# Patient Record
Sex: MALE | Race: WHITE | HISPANIC OR LATINO | Employment: STUDENT | URBAN - METROPOLITAN AREA
[De-identification: names, ages, dates, MRNs, and addresses within clinical notes are randomized per-mention and may not be internally consistent; named-entity substitution may affect disease eponyms.]

---
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
## 2021-03-18 ENCOUNTER — TELEPHONE (OUTPATIENT)
Dept: PEDIATRICS | Facility: CLINIC | Age: 21
End: 2021-03-18

## 2022-01-05 ENCOUNTER — TELEPHONE (OUTPATIENT)
Dept: INTERNAL MEDICINE | Facility: CLINIC | Age: 22
End: 2022-01-05
Payer: COMMERCIAL

## 2022-01-05 NOTE — TELEPHONE ENCOUNTER
----- Message from Chacho Navarro sent at 1/5/2022  1:37 PM CST -----  Contact: elin [phone]  Mrn#  [id number]    Callback# [phone]     Additional Info# PT has never had a PCP as an adult and the PT is trying to set up an annual appt with this DR. Please callback to discuss scheduling options and possible medical advice.

## 2022-02-22 ENCOUNTER — OFFICE VISIT (OUTPATIENT)
Dept: INTERNAL MEDICINE | Facility: CLINIC | Age: 22
End: 2022-02-22
Payer: COMMERCIAL

## 2022-02-22 ENCOUNTER — LAB VISIT (OUTPATIENT)
Dept: LAB | Facility: HOSPITAL | Age: 22
End: 2022-02-22
Payer: COMMERCIAL

## 2022-02-22 VITALS
OXYGEN SATURATION: 99 % | DIASTOLIC BLOOD PRESSURE: 76 MMHG | SYSTOLIC BLOOD PRESSURE: 130 MMHG | BODY MASS INDEX: 23.49 KG/M2 | HEIGHT: 65 IN | WEIGHT: 141 LBS | HEART RATE: 90 BPM

## 2022-02-22 DIAGNOSIS — Z11.3 SCREEN FOR STD (SEXUALLY TRANSMITTED DISEASE): ICD-10-CM

## 2022-02-22 DIAGNOSIS — Z86.19 HISTORY OF GONORRHEA: ICD-10-CM

## 2022-02-22 DIAGNOSIS — Z00.00 VISIT FOR ANNUAL HEALTH EXAMINATION: Primary | ICD-10-CM

## 2022-02-22 DIAGNOSIS — Z86.19 HISTORY OF CHLAMYDIA: ICD-10-CM

## 2022-02-22 PROCEDURE — 3075F PR MOST RECENT SYSTOLIC BLOOD PRESS GE 130-139MM HG: ICD-10-PCS | Mod: CPTII,S$GLB,, | Performed by: INTERNAL MEDICINE

## 2022-02-22 PROCEDURE — 3008F BODY MASS INDEX DOCD: CPT | Mod: CPTII,S$GLB,, | Performed by: INTERNAL MEDICINE

## 2022-02-22 PROCEDURE — 99999 PR PBB SHADOW E&M-EST. PATIENT-LVL IV: CPT | Mod: PBBFAC,,, | Performed by: INTERNAL MEDICINE

## 2022-02-22 PROCEDURE — 3075F SYST BP GE 130 - 139MM HG: CPT | Mod: CPTII,S$GLB,, | Performed by: INTERNAL MEDICINE

## 2022-02-22 PROCEDURE — 99999 PR PBB SHADOW E&M-EST. PATIENT-LVL IV: ICD-10-PCS | Mod: PBBFAC,,, | Performed by: INTERNAL MEDICINE

## 2022-02-22 PROCEDURE — 3078F PR MOST RECENT DIASTOLIC BLOOD PRESSURE < 80 MM HG: ICD-10-PCS | Mod: CPTII,S$GLB,, | Performed by: INTERNAL MEDICINE

## 2022-02-22 PROCEDURE — 3008F PR BODY MASS INDEX (BMI) DOCUMENTED: ICD-10-PCS | Mod: CPTII,S$GLB,, | Performed by: INTERNAL MEDICINE

## 2022-02-22 PROCEDURE — 1160F PR REVIEW ALL MEDS BY PRESCRIBER/CLIN PHARMACIST DOCUMENTED: ICD-10-PCS | Mod: CPTII,S$GLB,, | Performed by: INTERNAL MEDICINE

## 2022-02-22 PROCEDURE — 99385 PR PREVENTIVE VISIT,NEW,18-39: ICD-10-PCS | Mod: S$GLB,,, | Performed by: INTERNAL MEDICINE

## 2022-02-22 PROCEDURE — 1159F MED LIST DOCD IN RCRD: CPT | Mod: CPTII,S$GLB,, | Performed by: INTERNAL MEDICINE

## 2022-02-22 PROCEDURE — 1160F RVW MEDS BY RX/DR IN RCRD: CPT | Mod: CPTII,S$GLB,, | Performed by: INTERNAL MEDICINE

## 2022-02-22 PROCEDURE — 87491 CHLMYD TRACH DNA AMP PROBE: CPT | Performed by: INTERNAL MEDICINE

## 2022-02-22 PROCEDURE — 1159F PR MEDICATION LIST DOCUMENTED IN MEDICAL RECORD: ICD-10-PCS | Mod: CPTII,S$GLB,, | Performed by: INTERNAL MEDICINE

## 2022-02-22 PROCEDURE — 3078F DIAST BP <80 MM HG: CPT | Mod: CPTII,S$GLB,, | Performed by: INTERNAL MEDICINE

## 2022-02-22 PROCEDURE — 99385 PREV VISIT NEW AGE 18-39: CPT | Mod: S$GLB,,, | Performed by: INTERNAL MEDICINE

## 2022-02-22 NOTE — PROGRESS NOTES
INTERNAL MEDICINE INITIAL VISIT NOTE      CHIEF COMPLAINT     No chief complaint on file.      HPI     Narendra Banegas is a 21 y.o. male here today to establish care from Dr. Socorro Reynoso. 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 He has a past medical history of asthma but has not used his albuterol inhaler since he was 9-10 years old. He currently has no asthma-related symptoms. In the past 3 months he has had an episode of Gonorrhea treated with an injection of amoxicillin and an episode of Chlamydia treated with a one time dose of azithromycin. He states that he currently does not have any lesions on his genitals, no discharge from his penis. However, he does describe an itchy and red rectum.Furthermore, he is recovering from cold-like symptoms from last weekend. We discussed safe sex practices and preventative measures that could be done include prep therapy.     Past Medical History:  Past Medical History:   Diagnosis Date    Asthma        Review of Systems:  Review of Systems   Constitutional: Negative for chills, diaphoresis, fever, malaise/fatigue and weight loss.   HENT: Negative for congestion and sore throat.    Respiratory: Negative for cough, hemoptysis, sputum production, shortness of breath, wheezing and stridor.    Cardiovascular: Negative for chest pain, palpitations, orthopnea, claudication and leg swelling.   Gastrointestinal: Negative for abdominal pain, heartburn, nausea and vomiting.   Genitourinary: Negative for dysuria, frequency, hematuria and urgency.   Musculoskeletal: Negative for back pain, myalgias and neck pain.   Skin: Positive for itching and rash.   Neurological: Negative.  Negative for loss of consciousness.   Endo/Heme/Allergies: Negative for environmental allergies and polydipsia. Does not bruise/bleed easily.   Psychiatric/Behavioral: Negative for depression, hallucinations, memory loss, substance abuse and suicidal ideas. The patient is not nervous/anxious  and does not have insomnia.        Health Maintenance:   Immunizations:   Influenza: Done  Tdap: 9/2012 (Due 9/2022)  Covid 19: complete  HPV: Scheduled 2nd shot/3 with [employer]   Prevnar rec at 65: N/A  Shingrix rec at 50: N/A    Cancer Screening:  PAP: N/A  Mammogram:  N/A  Colonoscopy:  N/A  DEXA:  N/A      PHYSICAL EXAM     There were no vitals taken for this visit.  Vitals:    02/22/22 1442   BP: 130/76   Pulse: 90     GEN - A+OX4, NAD   HEENT - PERRL, EOMI,   Neck - No thyromegaly or thyroid masses felt.  No cervical lymphadenopathy appreciated.  CV - RRR, no m/r/g  Chest - CTAB, no wheezing, crackles, or rhonchi  Abd - S/NT/ND/+BS.   Ext - 2+BDP. No C/C/E.  Skin - Normal color and texture, no rash, no skin lesions.  Genitalia - No lesions around rectum noted.     ASSESSMENT/PLAN     Narendra Banegas is a 21 y.o. male with conditions as above.     There are no diagnoses linked to this encounter.     Visit for annual health examination  -     CBC Auto Differential; Future; Expected date: 02/22/2022  -     Comprehensive Metabolic Panel; Future; Expected date: 02/22/2022  -     Hemoglobin A1C; Future; Expected date: 02/22/2022  -     Lipid Panel; Future; Expected date: 02/22/2022    History of chlamydia  -     C. trachomatis/N. gonorrhoeae by AMP DNA    History of gonorrhea  -     C. trachomatis/N. gonorrhoeae by AMP DNA    Screen for STD (sexually transmitted disease)  Throat and rectal swabs for GC/Chlamydia today  Counseled on need for bloodwork and urine, discussed PrEP therapy  To contact clinic once known if labs will be covered by insurance  -     RPR; Future; Expected date: 02/22/2022  -     C. trachomatis/N. gonorrhoeae by AMP DNA  -     Hepatitis Panel, Acute; Future; Expected date: 02/22/2022  -     HIV 1/2 Ag/Ab (4th Gen); Future; Expected date: 02/22/2022    HM as above    RTC in 4 mo, sooner if needed and depending on labs.    Department of Internal Medicine - [facility]  02/22/2022

## 2022-02-22 NOTE — PROGRESS NOTES
INTERNAL MEDICINE INITIAL VISIT NOTE      CHIEF COMPLAINT     No chief complaint on file.      HPI     Narendra Banegas is a 21 y.o. male with , here today to establish care. Transferring care from Dr. Socorro Reynoso.     Past Medical History:  Past Medical History:   Diagnosis Date    Asthma        Review of Systems:  ROS    Health Maintenance:   Immunizations:   Influenza - ?  Tdap - 9/2012  Covid 19 - ?  HPV - 2nd, 3rd doses pending  Prevnar rec at 65  Shingrix rec at 50    Cancer Screening:  PAP: n/a  Mammogram:   n/a  Colonoscopy: n/a    DEXA:   n/a      PHYSICAL EXAM     There were no vitals taken for this visit.    GEN - A+OX4, NAD   HEENT - PERRL, EOMI,   Neck - No thyromegaly or thyroid masses felt.  No cervical lymphadenopathy appreciated.  CV - RRR, no m/r/g  Chest - CTAB, no wheezing, crackles, or rhonchi  Abd - S/NT/ND/+BS.   Ext - 2+BDP. No C/C/E.  Skin - Normal color and texture, no rash, no skin lesions.    ASSESSMENT/PLAN     Narendra Banegas is a 21 y.o. male with conditions as above.   There are no diagnoses linked to this encounter.     HM as above    RTC in , sooner if needed and depending on labs.    Ilsa Wallace MD  Department of Internal Medicine - [facility]  02/22/2022

## 2022-02-25 ENCOUNTER — TELEPHONE (OUTPATIENT)
Dept: INTERNAL MEDICINE | Facility: CLINIC | Age: 22
End: 2022-02-25
Payer: COMMERCIAL

## 2022-02-25 LAB
C TRACH RRNA SPEC QL NAA+PROBE: POSITIVE
N GONORRHOEA RRNA SPEC QL NAA+PROBE: NEGATIVE
N.GONORROHEAE, AMP RNA SOURCE: ABNORMAL
SPECIMEN SOURCE: ABNORMAL

## 2022-02-25 NOTE — TELEPHONE ENCOUNTER
----- Message from Monika Fontenot NP sent at 2/25/2022  2:40 PM CST -----  Please call the patient and let him know that his rectal swab was positive for chlamydia. I have sent in doxycyline for 7 days.  He should refrain from sexual activity until treatment is completed. Thanks.

## 2022-02-25 NOTE — TELEPHONE ENCOUNTER
Called pt, let pt know results. Pt stated that he had a feeling that he had an infection due to inflamation in his anal cavity and pain. Pt stated that he had previously been on arythromycin for the chlamydia infection he had previously and had not been sexually active since then. Pt stated he would go  medication sent by Monika Fontenot [facility] physician and take it to completion. Pt stated he would keep us updated

## 2022-03-07 NOTE — TELEPHONE ENCOUNTER
Spoke with pt, he states that she has only been on the antibiotic for 4 days, pt states that he did drink on yesterday and he is wondering if this inhibited the medication from working as it normally should have. Please advise.

## 2022-03-14 ENCOUNTER — TELEPHONE (OUTPATIENT)
Dept: INTERNAL MEDICINE | Facility: CLINIC | Age: 22
End: 2022-03-14
Payer: COMMERCIAL

## 2022-03-14 NOTE — TELEPHONE ENCOUNTER
----- Message from Uma Luna sent at 3/14/2022 10:44 AM CDT -----  Contact: Narendra [phone]  Patient is calling and stating he needs to be seen today for testing. Regarding an infection that he has that is on going

## 2022-05-24 ENCOUNTER — PATIENT MESSAGE (OUTPATIENT)
Dept: INTERNAL MEDICINE | Facility: CLINIC | Age: 22
End: 2022-05-24
Payer: COMMERCIAL

## 2022-05-24 ENCOUNTER — TELEPHONE (OUTPATIENT)
Dept: INTERNAL MEDICINE | Facility: CLINIC | Age: 22
End: 2022-05-24
Payer: COMMERCIAL

## 2022-05-24 NOTE — TELEPHONE ENCOUNTER
----- Message from Pamella Correia sent at 5/24/2022  4:37 PM CDT -----  Type:  Patient Returning Call    Who Called: pt   Who Left Message for Patient: pt  Does the patient know what this is regarding?: pt looking to get hept A  and hpv shot   Would the patient rather a call back or a response via [facility]?  Call   Best Call Back Number:  Additional Information:  call back

## 2022-06-20 ENCOUNTER — OFFICE VISIT (OUTPATIENT)
Dept: INTERNAL MEDICINE | Facility: CLINIC | Age: 22
End: 2022-06-20
Payer: COMMERCIAL

## 2022-06-20 VITALS
SYSTOLIC BLOOD PRESSURE: 126 MMHG | HEIGHT: 64 IN | DIASTOLIC BLOOD PRESSURE: 82 MMHG | BODY MASS INDEX: 24.39 KG/M2 | OXYGEN SATURATION: 99 % | HEART RATE: 70 BPM | WEIGHT: 142.88 LBS

## 2022-06-20 DIAGNOSIS — Z23 NEED FOR TDAP VACCINATION: ICD-10-CM

## 2022-06-20 DIAGNOSIS — Z02.0 ENCOUNTER FOR SCHOOL EXAMINATION: Primary | ICD-10-CM

## 2022-06-20 DIAGNOSIS — Z23 NEED FOR HPV VACCINATION: ICD-10-CM

## 2022-06-20 DIAGNOSIS — Z86.19 HISTORY OF CHLAMYDIA: ICD-10-CM

## 2022-06-20 DIAGNOSIS — Z86.19 HISTORY OF GONORRHEA: ICD-10-CM

## 2022-06-20 PROCEDURE — 99213 PR OFFICE/OUTPT VISIT, EST, LEVL III, 20-29 MIN: ICD-10-PCS | Mod: S$PBB,,, | Performed by: INTERNAL MEDICINE

## 2022-06-20 PROCEDURE — 99999 PR PBB SHADOW E&M-EST. PATIENT-LVL IV: ICD-10-PCS | Mod: PBBFAC,,, | Performed by: INTERNAL MEDICINE

## 2022-06-20 PROCEDURE — 99213 OFFICE O/P EST LOW 20 MIN: CPT | Mod: S$PBB,,, | Performed by: INTERNAL MEDICINE

## 2022-06-20 PROCEDURE — 99999 PR PBB SHADOW E&M-EST. PATIENT-LVL IV: CPT | Mod: PBBFAC,,, | Performed by: INTERNAL MEDICINE

## 2022-06-20 NOTE — PROGRESS NOTES
"INTERNAL MEDICINE ESTABLISHED PATIENT VISIT NOTE    Subjective:     Chief Complaint: Annual Exam       Patient ID: Narendra Banegas is a 21 y.o. male, last seen by me 2/2022, here today for follow-up.     STD screening requested at last visit. Bloodwork deferred due to cost. Rectal/throat swabs for Gonorrhea/Chlamydia collected. Rectal swab positive for Chlamydia - empirically treated. Had repeat swab completed at [facility], infection reportedly cleared.     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 Requesting updating of immunization records.     Past Medical History:  Past Medical History:   Diagnosis Date    Asthma         Review of Systems:  Review of Systems   Constitutional: Negative for chills and fever.   HENT: Negative for congestion.    Respiratory: Negative for cough and shortness of breath.    Cardiovascular: Negative for chest pain.   Gastrointestinal: Negative for constipation, nausea and vomiting.   Genitourinary: Negative for hematuria and urgency.   Musculoskeletal: Negative for falls.   Skin: Negative for rash.   Neurological: Negative for dizziness and loss of consciousness.       Health Maintenance:   Immunizations:   Influenza - fall 2022  Tdap - 9/2012  Covid 19 - complete  HPV - 3rd dose pending  Prevnar rec at 65   Shingrix rec at 50    Cancer Screening:  PAP: n/a  Mammogram:  n/a   Colonoscopy:  n/a  DEXA:  n/a    Objective:   /82 (BP Location: Left arm, Patient Position: Sitting)   Pulse 70   Ht 5' 4" (1.626 m)   Wt 64.8 kg (142 lb 13.7 oz)   SpO2 99%   BMI 24.52 kg/m²      General: AAO x3, no apparent distress  HEENT: PERRL  CV: RRR, no m/r/g  Pulm: Lungs CTAB, no crackles, no wheezes  Abd: s/NT/ND +BS  Extremities: no c/c/e    Labs:       Assessment/Plan     Encounter for school examination  Immunization record reviewed, updated    History of gonorrhea    History of chlamydia  Reports re-testing and verification of no re-infection spring 2022    Need for Tdap vaccination  -     " Tdap Vaccine; Future; Expected date: 06/20/2022    Need for HPV vaccination  -     HPV Vaccine (9-Valent) (3 Dose) (IM); Standing     HM as above    RTC PRN.    Ilsa Wallace MD  Department of Internal Medicine - [facility]  06/20/2022

## 2022-06-21 ENCOUNTER — PATIENT OUTREACH (OUTPATIENT)
Dept: ADMINISTRATIVE | Facility: HOSPITAL | Age: 22
End: 2022-06-21
Payer: COMMERCIAL

## 2022-06-21 NOTE — PROGRESS NOTES
Health Maintenance Due   Topic Date Due    Hepatitis C Screening  Never done    HIV Screening  Never done         HM updated. Immunizations scanned and reconciled.        Livia Monroe LPN   Clinical Care Coordinator  Primary Care and Wellness

## 2022-06-22 ENCOUNTER — TELEPHONE (OUTPATIENT)
Dept: PEDIATRICS | Facility: CLINIC | Age: 22
End: 2022-06-22
Payer: COMMERCIAL

## 2022-06-22 NOTE — TELEPHONE ENCOUNTER
----- Message from Emmanuel Russo MA sent at 6/22/2022  2:19 PM CDT -----  Contact: [phone]  Patient called      In regards to speak with staff or provider about patient  getting a copy of the visit summary when he had chicken pox.  Patient stated that this was around 10 years ago.      Call back    [phone]

## 2022-06-23 ENCOUNTER — PATIENT MESSAGE (OUTPATIENT)
Dept: INTERNAL MEDICINE | Facility: CLINIC | Age: 22
End: 2022-06-23
Payer: COMMERCIAL

## 2022-07-08 ENCOUNTER — PATIENT MESSAGE (OUTPATIENT)
Dept: INTERNAL MEDICINE | Facility: CLINIC | Age: 22
End: 2022-07-08
Payer: COMMERCIAL

## 2022-07-12 ENCOUNTER — TELEPHONE (OUTPATIENT)
Dept: INTERNAL MEDICINE | Facility: CLINIC | Age: 22
End: 2022-07-12
Payer: COMMERCIAL

## 2022-07-12 NOTE — TELEPHONE ENCOUNTER
----- Message from Kitty Cooper sent at 7/12/2022  4:46 PM CDT -----  Contact: [phone]  Pt is calling for the lab work for chicken pox antibodies please give return call

## 2022-12-22 ENCOUNTER — OFFICE VISIT (OUTPATIENT)
Dept: INTERNAL MEDICINE | Facility: CLINIC | Age: 22
End: 2022-12-22
Payer: COMMERCIAL

## 2022-12-22 VITALS
HEIGHT: 64 IN | OXYGEN SATURATION: 98 % | WEIGHT: 147.63 LBS | HEART RATE: 85 BPM | DIASTOLIC BLOOD PRESSURE: 88 MMHG | BODY MASS INDEX: 25.2 KG/M2 | SYSTOLIC BLOOD PRESSURE: 142 MMHG

## 2022-12-22 DIAGNOSIS — L05.91 PILONIDAL CYST OF NATAL CLEFT: Primary | ICD-10-CM

## 2022-12-22 PROCEDURE — 99213 OFFICE O/P EST LOW 20 MIN: CPT | Mod: S$PBB,,, | Performed by: INTERNAL MEDICINE

## 2022-12-22 PROCEDURE — 99999 PR PBB SHADOW E&M-EST. PATIENT-LVL III: ICD-10-PCS | Mod: PBBFAC,,, | Performed by: INTERNAL MEDICINE

## 2022-12-22 PROCEDURE — 99999 PR PBB SHADOW E&M-EST. PATIENT-LVL III: CPT | Mod: PBBFAC,,, | Performed by: INTERNAL MEDICINE

## 2022-12-22 PROCEDURE — 99213 PR OFFICE/OUTPT VISIT, EST, LEVL III, 20-29 MIN: ICD-10-PCS | Mod: S$PBB,,, | Performed by: INTERNAL MEDICINE

## 2022-12-22 NOTE — PROGRESS NOTES
"INTERNAL MEDICINE ESTABLISHED PATIENT VISIT NOTE    Subjective:     Chief Complaint: Follow-up       Patient ID: Narendra Banegas is a 22 y.o. male  last seen by me 2022, here today for follow-up.    Reports bump on tailbone that has grown in size over the past few months. Painful when sitting down, lying down. Currently inflamed, no discharge. No other similar lesions previously.     Past Medical History:  Past Medical History:   Diagnosis Date    Asthma        Review of Systems:  Review of Systems   Constitutional:  Negative for chills and fever.   HENT:  Negative for congestion.    Respiratory:  Negative for cough and shortness of breath.    Cardiovascular:  Negative for chest pain.   Gastrointestinal:  Negative for constipation, nausea and vomiting.   Genitourinary:  Negative for hematuria and urgency.   Musculoskeletal:  Negative for falls.   Skin:  Positive for rash.   Neurological:  Negative for dizziness and loss of consciousness.     Health Maintenance:   Immunizations:   Influenza - reports completion  Tdap - 2012  Covid 19 - complete  HPV - complete  Prevnar rec at 65   Shingrix rec at 50     Cancer Screening:  PAP: n/a  Mammogram:  n/a   Colonoscopy:  n/a  DEXA:  n/a    Objective:   BP (!) 142/88 (BP Location: Left arm, Patient Position: Sitting, BP Method: Medium (Manual))   Pulse 85   Ht 5' 4" (1.626 m)   Wt 67 kg (147 lb 9.6 oz)   SpO2 98%   BMI 25.34 kg/m²      Buttocks: 1.5-2 cm pilonidal cyst lateral to gluteal cleft, midline pit with protruding hair.     Labs:       Assessment/Plan     Pilonidal cyst of case cleft  -     Ambulatory referral/consult to General Surgery; Future; Expected date: 2022    HM as above.    Ilsa Wallace MD  Department of Internal Medicine - [facility]  2022    "

## 2022-12-27 ENCOUNTER — OFFICE VISIT (OUTPATIENT)
Dept: SURGERY | Facility: CLINIC | Age: 22
End: 2022-12-27
Payer: COMMERCIAL

## 2022-12-27 VITALS
SYSTOLIC BLOOD PRESSURE: 147 MMHG | WEIGHT: 147.5 LBS | HEART RATE: 94 BPM | BODY MASS INDEX: 25.32 KG/M2 | DIASTOLIC BLOOD PRESSURE: 96 MMHG

## 2022-12-27 DIAGNOSIS — L05.91 PILONIDAL CYST OF NATAL CLEFT: Primary | ICD-10-CM

## 2022-12-27 PROCEDURE — 3008F PR BODY MASS INDEX (BMI) DOCUMENTED: ICD-10-PCS | Mod: CPTII,S$GLB,, | Performed by: SURGERY

## 2022-12-27 PROCEDURE — 3008F BODY MASS INDEX DOCD: CPT | Mod: CPTII,S$GLB,, | Performed by: SURGERY

## 2022-12-27 PROCEDURE — 99204 OFFICE O/P NEW MOD 45 MIN: CPT | Mod: S$GLB,,, | Performed by: SURGERY

## 2022-12-27 PROCEDURE — 1159F MED LIST DOCD IN RCRD: CPT | Mod: CPTII,S$GLB,, | Performed by: SURGERY

## 2022-12-27 PROCEDURE — 1159F PR MEDICATION LIST DOCUMENTED IN MEDICAL RECORD: ICD-10-PCS | Mod: CPTII,S$GLB,, | Performed by: SURGERY

## 2022-12-27 PROCEDURE — 3080F DIAST BP >= 90 MM HG: CPT | Mod: CPTII,S$GLB,, | Performed by: SURGERY

## 2022-12-27 PROCEDURE — 3077F SYST BP >= 140 MM HG: CPT | Mod: CPTII,S$GLB,, | Performed by: SURGERY

## 2022-12-27 PROCEDURE — 99204 PR OFFICE/OUTPT VISIT, NEW, LEVL IV, 45-59 MIN: ICD-10-PCS | Mod: S$GLB,,, | Performed by: SURGERY

## 2022-12-27 PROCEDURE — 3077F PR MOST RECENT SYSTOLIC BLOOD PRESSURE >= 140 MM HG: ICD-10-PCS | Mod: CPTII,S$GLB,, | Performed by: SURGERY

## 2022-12-27 PROCEDURE — 99999 PR PBB SHADOW E&M-EST. PATIENT-LVL III: CPT | Mod: PBBFAC,,, | Performed by: SURGERY

## 2022-12-27 PROCEDURE — 99999 PR PBB SHADOW E&M-EST. PATIENT-LVL III: ICD-10-PCS | Mod: PBBFAC,,, | Performed by: SURGERY

## 2022-12-27 PROCEDURE — 3080F PR MOST RECENT DIASTOLIC BLOOD PRESSURE >= 90 MM HG: ICD-10-PCS | Mod: CPTII,S$GLB,, | Performed by: SURGERY

## 2022-12-27 RX ORDER — SULFAMETHOXAZOLE AND TRIMETHOPRIM 800; 160 MG/1; MG/1
1 TABLET ORAL 2 TIMES DAILY
Qty: 14 TABLET | Refills: 0 | Status: SHIPPED | OUTPATIENT
Start: 2022-12-27 | End: 2023-01-03

## 2022-12-27 NOTE — PROGRESS NOTES
[facility]  OUTPATIENT H&P    REASON FOR VISIT/CC: Pilonidal cyst with infection    HPI: Narendra Banegas is a 22 y.o. male with chronic area to left of midline near gluteal cleft.  It has not been overly bothersome, just occasional discomfort with certain positions.  Recently it has been larger and red although this is improving without antibiotics.  He is otherwise healthy.    I have reviewed the patient's chart including prior progress notes, procedures and testing.     ROS:   Review of Systems   All other systems reviewed and are negative.    PROBLEM LIST:  There is no problem list on file for this patient.        HISTORY  Past Medical History:   Diagnosis Date    Asthma        No past surgical history on file.    Social History     Tobacco Use    Smoking status: Never    Smokeless tobacco: Never    Tobacco comments:     grandmother smokes   Substance Use Topics    Alcohol use: Yes     Alcohol/week: 5.0 standard drinks     Types: 5 Shots of liquor per week     Comment: Drinks 1x/week 5 Drinks a sitting    Drug use: Yes     Frequency: 1.0 times per week     Types: Marijuana       Family History   Problem Relation Age of Onset    No Known Problems Mother     No Known Problems Father     No Known Problems Brother     ADD / ADHD Brother     ODD Brother     COPD Maternal Grandmother     Diabetes Maternal Grandfather     Melanoma Paternal Grandfather     Psoriasis Neg Hx     Lupus Neg Hx     Eczema Neg Hx     Acne Neg Hx          MEDS:  Current Outpatient Medications on File Prior to Visit   Medication Sig Dispense Refill    albuterol (PROAIR HFA) 90 mcg/actuation inhaler Inhale 2 puffs into the lungs every 4 (four) hours as needed for Shortness of Breath. Rescue 1 Inhaler 0    inhalation spacing device Use as directed for inhalation. (Patient not taking: Reported on 12/22/2022) 1 Device 0     No current facility-administered medications on file prior to visit.       ALLERGIES:  Review of patient's  allergies indicates:  No Known Allergies      VITALS:  Vitals:    12/27/22 1146   BP: (!) 147/96   Pulse: 94         PHYSICAL EXAM:  Physical Exam  Constitutional:       Appearance: Normal appearance.   HENT:      Head: Normocephalic and atraumatic.   Skin:     Comments: Small pilonidal cyst with some erythema, no fluctuance   Neurological:      Mental Status: Mental status is at baseline.   Psychiatric:         Mood and Affect: Mood normal.         LABS:  Lab Results   Component Value Date    WBC 6.05 10/17/2007    RBC 4.44 10/17/2007    HGB 13.0 10/17/2007    HCT 36.3 10/17/2007     10/17/2007     Lab Results   Component Value Date    GLU 93 07/02/2007     07/02/2007    K 4.6 07/02/2007     07/02/2007    CO2 23 07/02/2007    BUN 10 07/02/2007    CREATININE 0.5 07/02/2007    CALCIUM 9.8 07/02/2007     Lab Results   Component Value Date    ALT 17 07/02/2007    AST 30 07/02/2007    ALKPHOS 197 07/02/2007    BILITOT 0.2 07/02/2007     No results found for: MG, PHOS      ASSESSMENT & PLAN:  22 y.o. male pilonidal cyst with low level infection, will give Rx for Bactrim.  No need to I and D.  We discussed what to expect from excisional surgery.  Pt is interested in surgery but lives in [place] and will find a surgeon there.      Nishant Medina M.D., F.A.C.S.  [facility] General Surgery  [facility]

## 2023-06-08 ENCOUNTER — PATIENT MESSAGE (OUTPATIENT)
Dept: SURGERY | Facility: CLINIC | Age: 23
End: 2023-06-08
Payer: COMMERCIAL

## 2023-06-13 DIAGNOSIS — L05.91 PILONIDAL CYST OF NATAL CLEFT: Primary | ICD-10-CM

## 2023-06-13 RX ORDER — SULFAMETHOXAZOLE AND TRIMETHOPRIM 800; 160 MG/1; MG/1
1 TABLET ORAL 2 TIMES DAILY
Qty: 28 TABLET | Refills: 0 | Status: SHIPPED | OUTPATIENT
Start: 2023-06-13 | End: 2023-06-27

## 2023-06-13 RX ORDER — SULFAMETHOXAZOLE AND TRIMETHOPRIM 800; 160 MG/1; MG/1
1 TABLET ORAL 2 TIMES DAILY
Qty: 28 TABLET | Refills: 0 | Status: SHIPPED | OUTPATIENT
Start: 2023-06-13 | End: 2023-06-13

## 2023-07-07 ENCOUNTER — OFFICE VISIT (OUTPATIENT)
Dept: SURGERY | Facility: CLINIC | Age: 23
End: 2023-07-07
Payer: COMMERCIAL

## 2023-07-07 VITALS
WEIGHT: 143.63 LBS | HEART RATE: 92 BPM | SYSTOLIC BLOOD PRESSURE: 126 MMHG | HEIGHT: 64 IN | BODY MASS INDEX: 24.52 KG/M2 | DIASTOLIC BLOOD PRESSURE: 91 MMHG

## 2023-07-07 DIAGNOSIS — L05.91 PILONIDAL CYST: ICD-10-CM

## 2023-07-07 DIAGNOSIS — L05.91 PILONIDAL CYST OF NATAL CLEFT: Primary | ICD-10-CM

## 2023-07-07 PROCEDURE — 99213 PR OFFICE/OUTPT VISIT, EST, LEVL III, 20-29 MIN: ICD-10-PCS | Mod: S$GLB,,, | Performed by: SURGERY

## 2023-07-07 PROCEDURE — 99999 PR PBB SHADOW E&M-EST. PATIENT-LVL IV: CPT | Mod: PBBFAC,,, | Performed by: SURGERY

## 2023-07-07 PROCEDURE — 99999 PR PBB SHADOW E&M-EST. PATIENT-LVL IV: ICD-10-PCS | Mod: PBBFAC,,, | Performed by: SURGERY

## 2023-07-07 PROCEDURE — 99213 OFFICE O/P EST LOW 20 MIN: CPT | Mod: S$GLB,,, | Performed by: SURGERY

## 2023-07-07 PROCEDURE — 99214 OFFICE O/P EST MOD 30 MIN: CPT | Mod: PBBFAC,PO | Performed by: SURGERY

## 2023-07-07 RX ORDER — SODIUM CHLORIDE 9 MG/ML
INJECTION, SOLUTION INTRAVENOUS CONTINUOUS
Status: CANCELLED | OUTPATIENT
Start: 2023-07-07

## 2023-07-07 NOTE — PROGRESS NOTES
[facility]  OUTPATIENT Follow-Up      HPI: Narendra Banegas is a 22 y.o. male , recent infection treated with antibiotics.  Feels better now and interested in surgery.    I have reviewed the patient's chart including prior progress notes, procedures and testing.     ROS:   Review of Systems   All other systems reviewed and are negative.    PROBLEM LIST:  There is no problem list on file for this patient.        HISTORY  Past Medical History:   Diagnosis Date    Asthma        History reviewed. No pertinent surgical history.    Social History     Tobacco Use    Smoking status: Never    Smokeless tobacco: Never    Tobacco comments:     grandmother smokes   Substance Use Topics    Alcohol use: Yes     Alcohol/week: 5.0 standard drinks     Types: 5 Shots of liquor per week     Comment: Drinks 1x/week 5 Drinks a sitting    Drug use: Yes     Frequency: 1.0 times per week     Types: Marijuana       Family History   Problem Relation Age of Onset    No Known Problems Mother     No Known Problems Father     No Known Problems Brother     ADD / ADHD Brother     ODD Brother     COPD Maternal Grandmother     Diabetes Maternal Grandfather     Melanoma Paternal Grandfather     Psoriasis Neg Hx     Lupus Neg Hx     Eczema Neg Hx     Acne Neg Hx          MEDS:  Current Outpatient Medications on File Prior to Visit   Medication Sig Dispense Refill    albuterol (PROAIR HFA) 90 mcg/actuation inhaler Inhale 2 puffs into the lungs every 4 (four) hours as needed for Shortness of Breath. Rescue (Patient not taking: Reported on 7/7/2023) 1 Inhaler 0    inhalation spacing device Use as directed for inhalation. (Patient not taking: Reported on 12/22/2022) 1 Device 0     No current facility-administered medications on file prior to visit.       ALLERGIES:  Review of patient's allergies indicates:  No Known Allergies      VITALS:  Vitals:    07/07/23 1352   BP: (!) 126/91   Pulse: 92         PHYSICAL EXAM:  Physical  Exam  Constitutional:       Appearance: Normal appearance.   HENT:      Head: Normocephalic and atraumatic.   Pulmonary:      Effort: Pulmonary effort is normal.   Skin:     Comments: Left gluteal region, upper and lateral to midline there is a chronic cyst, no active infection         LABS:  Lab Results   Component Value Date    WBC 6.05 10/17/2007    RBC 4.44 10/17/2007    HGB 13.0 10/17/2007    HCT 36.3 10/17/2007     10/17/2007     Lab Results   Component Value Date    GLU 93 07/02/2007     07/02/2007    K 4.6 07/02/2007     07/02/2007    CO2 23 07/02/2007    BUN 10 07/02/2007    CREATININE 0.5 07/02/2007    CALCIUM 9.8 07/02/2007     Lab Results   Component Value Date    ALT 17 07/02/2007    AST 30 07/02/2007    ALKPHOS 197 07/02/2007    BILITOT 0.2 07/02/2007     No results found for: MG, PHOS      ASSESSMENT & PLAN:  22 y.o. male , desires excision, plan for 7/21/23.      Nishant Medina M.D., F.A.C.S.  [facility] General Surgery  [facility]

## 2023-07-14 ENCOUNTER — PATIENT MESSAGE (OUTPATIENT)
Dept: SURGERY | Facility: HOSPITAL | Age: 23
End: 2023-07-14